# Patient Record
Sex: FEMALE | Race: WHITE | NOT HISPANIC OR LATINO
[De-identification: names, ages, dates, MRNs, and addresses within clinical notes are randomized per-mention and may not be internally consistent; named-entity substitution may affect disease eponyms.]

---

## 2024-03-19 ENCOUNTER — APPOINTMENT (OUTPATIENT)
Dept: OBGYN | Facility: CLINIC | Age: 29
End: 2024-03-19
Payer: COMMERCIAL

## 2024-03-19 ENCOUNTER — NON-APPOINTMENT (OUTPATIENT)
Age: 29
End: 2024-03-19

## 2024-03-19 VITALS
DIASTOLIC BLOOD PRESSURE: 75 MMHG | SYSTOLIC BLOOD PRESSURE: 107 MMHG | WEIGHT: 140 LBS | OXYGEN SATURATION: 98 % | HEART RATE: 78 BPM

## 2024-03-19 DIAGNOSIS — Z80.8 FAMILY HISTORY OF MALIGNANT NEOPLASM OF OTHER ORGANS OR SYSTEMS: ICD-10-CM

## 2024-03-19 DIAGNOSIS — Z01.419 ENCOUNTER FOR GYNECOLOGICAL EXAMINATION (GENERAL) (ROUTINE) W/OUT ABNORMAL FINDINGS: ICD-10-CM

## 2024-03-19 DIAGNOSIS — Z80.3 FAMILY HISTORY OF MALIGNANT NEOPLASM OF BREAST: ICD-10-CM

## 2024-03-19 DIAGNOSIS — Z80.41 FAMILY HISTORY OF MALIGNANT NEOPLASM OF OVARY: ICD-10-CM

## 2024-03-19 DIAGNOSIS — Z80.6 FAMILY HISTORY OF LEUKEMIA: ICD-10-CM

## 2024-03-19 PROBLEM — Z00.00 ENCOUNTER FOR PREVENTIVE HEALTH EXAMINATION: Status: ACTIVE | Noted: 2024-03-19

## 2024-03-19 PROCEDURE — 99385 PREV VISIT NEW AGE 18-39: CPT

## 2024-03-19 RX ORDER — NORETHINDRONE ACETATE AND ETHINYL ESTRADIOL 1MG-20(24)
1-20 KIT ORAL
Qty: 84 | Refills: 3 | Status: ACTIVE | COMMUNITY
Start: 1900-01-01 | End: 1900-01-01

## 2024-03-19 NOTE — COUNSELING
[Contraception/ Emergency Contraception/ Safe Sexual Practices] : contraception, emergency contraception, safe sexual practices [Preconception Care/ Fertility options] : preconception care, fertility options [HPV Vaccine] : HPV Vaccine [Lab Results] : lab results

## 2024-03-19 NOTE — PLAN
[FreeTextEntry1] : annual: pap and hpv on pill Risks, benefits, alternative to combined oral contraceptives discussed. Patient told to carefully read package insert. : may TTC later, gave [facility] info  got gardasil   *NEGATIVE for brca gene in family: 2 aunts with br cancer, early 50s,

## 2024-03-19 NOTE — PHYSICAL EXAM
[Chaperone Present] : A chaperone was present in the examining room during all aspects of the physical examination [Alert] : alert [Appropriately responsive] : appropriately responsive [No Lymphadenopathy] : no lymphadenopathy [No Acute Distress] : no acute distress [Regular Rate Rhythm] : regular rate rhythm [No Murmurs] : no murmurs [Clear to Auscultation B/L] : clear to auscultation bilaterally [Soft] : soft [Non-distended] : non-distended [Non-tender] : non-tender [No HSM] : No HSM [No Lesions] : no lesions [No Mass] : no mass [Examination Of The Breasts] : a normal appearance [Oriented x3] : oriented x3 [No Masses] : no breast masses were palpable [Labia Majora] : normal [Labia Minora] : normal [Normal] : normal [Uterine Adnexae] : normal

## 2024-03-19 NOTE — HISTORY OF PRESENT ILLNESS
[Patient reported PAP Smear was normal] : Patient reported PAP Smear was normal [Y] : Patient is sexually active [Oral Contraceptive] : uses oral contraception pills [N] : Patient denies prior pregnancies [Regular Cycle Intervals] : periods have been regular [Frequency: Q ___ days] : menstrual periods occur approximately every [unfilled] days [Menarche Age: ____] : age at menarche was [unfilled] [Currently Active] : currently active [No] : No [Men] : men [Yes] : Yes [Patient refuses STI testing] : Patient refuses STI testing [PapSmeardate] : 5/2021 [LMPDate] : 03/05/24 [MensesFreq] : 28 [MensesLength] : 1 [TextBox_9] : 13 [FreeTextEntry1] : 03/05/24 [FreeTextEntry3] : OCP

## 2024-03-20 LAB — HPV HIGH+LOW RISK DNA PNL CVX: NOT DETECTED

## 2024-03-21 ENCOUNTER — TRANSCRIPTION ENCOUNTER (OUTPATIENT)
Age: 29
End: 2024-03-21

## 2024-03-25 ENCOUNTER — TRANSCRIPTION ENCOUNTER (OUTPATIENT)
Age: 29
End: 2024-03-25

## 2024-03-25 LAB — CYTOLOGY CVX/VAG DOC THIN PREP: NORMAL
